# Patient Record
Sex: MALE | Race: WHITE | Employment: FULL TIME | URBAN - METROPOLITAN AREA
[De-identification: names, ages, dates, MRNs, and addresses within clinical notes are randomized per-mention and may not be internally consistent; named-entity substitution may affect disease eponyms.]

---

## 2019-10-07 ENCOUNTER — TRANSCRIBE ORDERS (OUTPATIENT)
Dept: URGENT CARE | Facility: CLINIC | Age: 29
End: 2019-10-07

## 2019-10-07 DIAGNOSIS — Z00.8 ENCOUNTER FOR BIOMETRIC SCREENING: Primary | ICD-10-CM

## 2019-10-07 DIAGNOSIS — Z23 NEEDS FLU SHOT: ICD-10-CM

## 2019-10-07 DIAGNOSIS — Z00.8 ENCOUNTER FOR BIOMETRIC SCREENING: ICD-10-CM

## 2019-10-07 PROCEDURE — 80323 ALKALOIDS NOS: CPT | Performed by: NURSE PRACTITIONER

## 2019-10-15 ENCOUNTER — TELEPHONE (OUTPATIENT)
Dept: URGENT CARE | Facility: CLINIC | Age: 29
End: 2019-10-15

## 2019-10-15 LAB
COTININE SERPL-MCNC: NORMAL NG/ML
NICOTINE SERPL-MCNC: NORMAL NG/ML

## 2019-10-29 ENCOUNTER — APPOINTMENT (OUTPATIENT)
Dept: URGENT CARE | Facility: CLINIC | Age: 29
End: 2019-10-29

## 2019-10-29 VITALS
DIASTOLIC BLOOD PRESSURE: 78 MMHG | BODY MASS INDEX: 23.49 KG/M2 | SYSTOLIC BLOOD PRESSURE: 138 MMHG | WEIGHT: 177.2 LBS | HEIGHT: 73 IN

## 2019-10-29 DIAGNOSIS — Z13.6 SCREENING FOR CARDIOVASCULAR CONDITION: Primary | ICD-10-CM

## 2019-11-27 ENCOUNTER — OFFICE VISIT (OUTPATIENT)
Dept: URGENT CARE | Facility: CLINIC | Age: 29
End: 2019-11-27

## 2019-11-27 VITALS
OXYGEN SATURATION: 97 % | HEART RATE: 63 BPM | RESPIRATION RATE: 16 BRPM | SYSTOLIC BLOOD PRESSURE: 110 MMHG | TEMPERATURE: 97.6 F | DIASTOLIC BLOOD PRESSURE: 78 MMHG

## 2019-11-27 DIAGNOSIS — S81.812A LACERATION OF LEFT LEG, INITIAL ENCOUNTER: Primary | ICD-10-CM

## 2019-11-27 NOTE — PROGRESS NOTES
[facility]        NAME: Radha Hobbs is a 30 y o  male  : 1990    MRN: [id number]  DATE: 2019  TIME: 11:46 AM    Assessment and Plan   Laceration of left leg, initial encounter [S81 162A]  1  Laceration of left leg, initial encounter           Patient Instructions       Go to urgent care today for wound closure  Update your tetanus vaccine today  Keep your wound clean and dry  Watch for signs of infection such as redness, swelling, drainage, or severe pain  Seek medical care if this occurs  Proceed to  ER if symptoms worsen  Chief Complaint   No chief complaint on file  History of Present Illness       Last night pt was doing Crossfit when he hit his left lower leg on a wooden box  Laceration to his left anterior lower leg  He cleaned the wound with rubbing alcohol and applied a dressing  Mild bleeding and oozing continues today  No severe pain or inability to walk  He cannot recall his last tetanus vaccine  Review of Systems   Review of Systems   Constitutional: Negative  Respiratory: Negative  Cardiovascular: Negative  Skin: Positive for wound (laceration to anterior left lower leg)  Neurological: Negative  All other systems reviewed and are negative  Current Medications     No current outpatient medications on file  Current Allergies     Allergies as of 2019    (No Known Allergies)            The following portions of the patient's history were reviewed and updated as appropriate: allergies, current medications, past family history, past medical history, past social history, past surgical history and problem list      Past Medical History:   Diagnosis Date    Eczema        Past Surgical History:   Procedure Laterality Date    WISDOM TOOTH EXTRACTION         History reviewed  No pertinent family history  Medications have been verified          Objective   /78 (BP Location: Right arm, Patient Position: Sitting, Cuff Size: Adult)   Pulse 63   Temp 97 6 °F (36 4 °C) (Tympanic)   Resp 16   SpO2 97%        Physical Exam     Physical Exam   Constitutional: He is oriented to person, place, and time  He appears well-developed and well-nourished  HENT:   Head: Normocephalic and atraumatic  Cardiovascular: Normal rate  Pulmonary/Chest: Effort normal    Musculoskeletal: Normal range of motion  Neurological: He is alert and oriented to person, place, and time  Skin: Skin is warm and dry  Capillary refill takes less than 2 seconds  Laceration noted  Psychiatric: He has a normal mood and affect  His behavior is normal    Vitals reviewed

## 2019-11-27 NOTE — ASSESSMENT & PLAN NOTE
Wound cleaned and steri strips applied  Unable to approximate wound edges with steri strips alone  Pt referred to urgent care for more complex wound closure and tetanus vaccine

## 2019-11-27 NOTE — PATIENT INSTRUCTIONS
Go to urgent care today for wound closure  Update your tetanus vaccine today  Keep your wound clean and dry  Watch for signs of infection such as redness, swelling, drainage, or severe pain  Seek medical care if this occurs  Laceration, Ambulatory Care   GENERAL INFORMATION:   A laceration  is an injury to the skin and the soft tissue underneath it  Lacerations happen when you are cut or hit by something  They can happen anywhere on the body  Common symptoms include the following:   · Injury or wound to skin and tissue of any shape size that looks like a cut, tear, or gash    · Edges of the wound may be close together or wide apart    · The injury may hurt, bleed, bruise, or swell    · Lacerations in certain areas of the body, such as the scalp, may bleed a lot    · Numbness around the wound    · Decreased movement in an area below the wound  Seek immediate care for the following symptoms:   · Symptoms such as redness, pain, or fever that get worse quickly    · Heavy bleeding or bleeding that does not stop after 10 minutes of holding firm, direct pressure over the wound  Treatment for a laceration  includes care to stop any bleeding  Your healthcare provider will stop the bleeding by applying pressure to the wound  He may need to check your wound for foreign objects and clean it to decrease the chance of infection  You may be given medicine to numb the area and decrease pain  Your laceration may be closed with stitches, staples, tissue glue, or medical strips  Some lacerations may heal better without stitches  Ask your healthcare provider if you need a tetanus shot  Care for a laceration:   · Keep the wound dry for the first 24 to 48 hours  or as directed  Wash your hands with soap and warm water before and after you care for your wound  After that, gently clean the wound once or twice a day with cool water  Use soap to clean around the wound, but try not to get any on the wound edges   Do not use alcohol or hydrogen peroxide to clean your wound unless you are directed to do so  · Leave your bandage on as long as directed  Bandages keep your wound clean and protected  They can also prevent swelling  Ask when and how to change your bandage  Be careful not to wrap the bandage or tape too tightly  This could cut off blood flow and cause more injury  · Gently clean with soap and water if your wound was closed with staples or stitches  Remove the bandage over the area and gently clean with soap and water 24 to 48 hours after your injury  Pat the area dry and cover again with a clean dressing  · Keep the area clean and dry if your wound was closed with wound tape  You may have wound tape or medical strips to hold your wound closed  The strips will usually fall off on their own after several days  · Do not use any ointments or lotions on the area if your wound was closed with tissue glue  You may shower, but do not swim or soak in a bathtub  Gently pat the area dry after you take a shower  Do not pick at or scrub the glue area  If the glue comes off too soon, call your primary healthcare provider  Never use your own glue to put the wound back together  Follow up with your healthcare provider as directed:  Write down your questions so you remember to ask them during your visits  CARE AGREEMENT:   You have the right to help plan your care  Learn about your health condition and how it may be treated  Discuss treatment options with your caregivers to decide what care you want to receive  You always have the right to refuse treatment  The above information is an  only  It is not intended as medical advice for individual conditions or treatments  Talk to your doctor, nurse or pharmacist before following any medical regimen to see if it is safe and effective for you    © 2014 [employer] is for End User's use only and may not be sold, redistributed or otherwise used for commercial purposes  All illustrations and images included in CareNotes® are the copyrighted property of A D [employer]  or [employer]ahan

## 2020-10-01 ENCOUNTER — APPOINTMENT (OUTPATIENT)
Dept: URGENT CARE | Facility: CLINIC | Age: 30
End: 2020-10-01

## 2020-10-01 DIAGNOSIS — Z23 NEEDS FLU SHOT: Primary | ICD-10-CM

## 2021-09-16 ENCOUNTER — APPOINTMENT (OUTPATIENT)
Dept: URGENT CARE | Facility: CLINIC | Age: 31
End: 2021-09-16

## 2021-09-16 DIAGNOSIS — Z23 NEEDS FLU SHOT: Primary | ICD-10-CM

## 2022-09-20 ENCOUNTER — APPOINTMENT (OUTPATIENT)
Dept: URGENT CARE | Facility: CLINIC | Age: 32
End: 2022-09-20

## 2022-09-20 DIAGNOSIS — Z23 NEEDS FLU SHOT: Primary | ICD-10-CM

## 2022-09-22 ENCOUNTER — OFFICE VISIT (OUTPATIENT)
Dept: URGENT CARE | Facility: CLINIC | Age: 32
End: 2022-09-22

## 2022-09-22 VITALS
RESPIRATION RATE: 16 BRPM | OXYGEN SATURATION: 97 % | DIASTOLIC BLOOD PRESSURE: 68 MMHG | HEART RATE: 62 BPM | TEMPERATURE: 97.6 F | SYSTOLIC BLOOD PRESSURE: 110 MMHG

## 2022-09-22 DIAGNOSIS — Z29.8 ALTITUDE SICKNESS PROPHYLAXIS: Primary | ICD-10-CM

## 2022-09-22 PROBLEM — Z29.89 ALTITUDE SICKNESS PROPHYLAXIS: Status: ACTIVE | Noted: 2022-09-22

## 2022-09-22 RX ORDER — LORATADINE 10 MG/1
10 TABLET ORAL DAILY
COMMUNITY

## 2022-09-22 RX ORDER — ACETAZOLAMIDE 125 MG/1
125 TABLET ORAL 2 TIMES DAILY
Qty: 18 TABLET | Refills: 0 | Status: SHIPPED | OUTPATIENT
Start: 2022-09-22 | End: 2022-10-01

## 2022-09-22 RX ORDER — DUTASTERIDE 0.5 MG/1
0.5 CAPSULE, LIQUID FILLED ORAL DAILY
COMMUNITY

## 2022-09-22 NOTE — PATIENT INSTRUCTIONS
Take Diamox as prescribed, take with food  Stay hydrated  Recommend slow ascent and descent  Seek emergency medical care if needed

## 2022-09-22 NOTE — ASSESSMENT & PLAN NOTE
Discussed preventative measures and Rx written for Diamox  Pt advised on appropriate use  Reasons to seek follow up care reviewed with pt  All questions answered

## 2022-09-22 NOTE — PROGRESS NOTES
[facility]        NAME: Gloria Torres is a 27 y o  male  : 1990    MRN: [id number]  DATE: 2022  TIME: 3:00 PM    Assessment and Plan   Altitude sickness prophylaxis [Z29 8]  1  Altitude sickness prophylaxis  acetaZOLAMIDE (DIAMOX) 125 mg tablet         Patient Instructions       Take Diamox as prescribed, take with food  Stay hydrated  Recommend slow ascent and descent  Seek emergency medical care if needed  Proceed to  ER if symptoms worsen  Chief Complaint     Chief Complaint   Patient presents with    Travel Consult     Needs altitude sickness prophylaxis          History of Present Illness       Pt will be traveling to [place] (regions include [place], [facility], hiking in [place], and along the [facility] [place] border) on 10/23/22 for 10 days  He will be hiking and camping  He will reach the apex of his hike on day 5  He is up to date on all his vaccines  He requests altitude sickness prevention medication  He has never traveled at this high of an altitude before  No known liver or kidney disease  He feels well today, no medical problems reported  Review of Systems   Review of Systems   Constitutional: Negative  Negative for chills and fever  HENT: Negative  Respiratory: Negative  Negative for cough, shortness of breath and wheezing  Cardiovascular: Negative  Negative for chest pain and palpitations  Gastrointestinal: Negative  Negative for abdominal pain, diarrhea, nausea and vomiting  Endocrine: Negative  Genitourinary: Negative  Musculoskeletal: Negative  Skin: Negative  Negative for rash  Allergic/Immunologic: Positive for environmental allergies (seasonal allergies- controlled with OTC antihistamines)  Neurological: Negative  Hematological: Negative  Psychiatric/Behavioral: Negative  All other systems reviewed and are negative          Current Medications       Current Outpatient Medications:     acetaZOLAMIDE (DIAMOX) 125 mg tablet, Take 1 tablet (125 mg total) by mouth 2 (two) times a day for 9 days Start 24-48 hours prior to ascent, stop 2-3 days after peak arrival , Disp: 18 tablet, Rfl: 0    dutasteride (AVODART) 0 5 mg capsule, Take 0 5 mg by mouth daily, Disp: , Rfl:     loratadine (CLARITIN) 10 mg tablet, Take 10 mg by mouth daily, Disp: , Rfl:     Current Allergies     Allergies as of 09/22/2022    (No Known Allergies)            The following portions of the patient's history were reviewed and updated as appropriate: allergies, current medications, past family history, past medical history, past social history, past surgical history and problem list      Past Medical History:   Diagnosis Date    Allergic rhinitis     seasonal- worse in fall    Eczema        Past Surgical History:   Procedure Laterality Date    WISDOM TOOTH EXTRACTION         History reviewed  No pertinent family history  Medications have been verified  Objective   /68 (BP Location: Right arm, Patient Position: Sitting, Cuff Size: Adult)   Pulse 62   Temp 97 6 °F (36 4 °C) (Tympanic)   Resp 16   SpO2 97%   No LMP for male patient  Physical Exam     Physical Exam  Vitals reviewed  Constitutional:       Appearance: Normal appearance  He is well-developed  HENT:      Head: Normocephalic and atraumatic  Right Ear: External ear normal       Left Ear: External ear normal       Nose: Nose normal       Mouth/Throat:      Mouth: Mucous membranes are moist       Pharynx: Oropharynx is clear  Eyes:      Conjunctiva/sclera: Conjunctivae normal       Pupils: Pupils are equal, round, and reactive to light  Cardiovascular:      Rate and Rhythm: Normal rate and regular rhythm  Pulses: Normal pulses  Pulmonary:      Effort: Pulmonary effort is normal       Breath sounds: Normal breath sounds  Musculoskeletal:         General: Normal range of motion  Skin:     General: Skin is warm and dry        Capillary Refill: Capillary refill takes less than 2 seconds  Neurological:      General: No focal deficit present  Mental Status: He is alert and oriented to person, place, and time     Psychiatric:         Mood and Affect: Mood normal          Behavior: Behavior normal

## 2024-02-21 PROBLEM — S81.812A LACERATION OF LEFT LEG, INITIAL ENCOUNTER: Status: RESOLVED | Noted: 2019-11-27 | Resolved: 2024-02-21

## 2025-03-02 NOTE — PROGRESS NOTES
"Name: Tayo Aviles      : 1990      MRN: [id number]  Encounter Provider: Stephanie Mclean MD  Encounter Date: 3/3/2025   Encounter department: [facility]  :  Assessment & Plan  Patellofemoral syndrome of left knee  -Patient with physical examination, history consistent with patellofemoral syndrome of the left knee  -Patient provided with home exercise regiment to help with mitigation of patellofemoral syndrome as well as exercise and therapy to help with strengthening of the knee and ankle joint.  -Patient also counseled on utilization of compression sleeve of the affected knee to help with pain medication during exercise activities and daily activities.  -Risks and benefits with regards to potential PRP injection of the affected joint was discussed with the patient and the patient was interested in pursuing with PRP injection in the future.  Patient was provided with appropriate documentation for review, instructions for scheduling an appropriate time slot to pursue PRP injection, and informed of risks and benefits.    Patient to return after appropriate scheduling for PRP injection be conducted in the clinic.         Acute pain of left knee  See plan as discussed under \"patellofemoral syndrome of left knee\" documented on 3/30/2025  Orders:    XR knee 3 vw left non injury; Future        History of Present Illness   HPI  Tayo Aviles is a 34 y.o. male who presents new evaluation of left knee pain.    Patient describes insidious onset of left knee pain that is anterior and underneath his kneecap.  He describes that it was exacerbated with deep squatting.  Describes that he is routinely active with participation in running, CrossFit, and other strenuous activities.  Describes mileage that he be running per week as approximately 50 miles but has had a decrease in this mild duration given the cold temperatures over the winter.    States that he has been proceeding with activity " "modification with no utilization of over-the-counter medications or other ointments or creams to the area and has noted that the pain intensity has diminished but not completed abated and the frequency is less noticeable.  Does describe when he has the knee in flexion while putting on socks will cause an exacerbation or grinding sensation in his knee.    No overlying redness or erythema.  No acute traumatic process to the knee that the patient is aware of.  History obtained from: patient    Review of Systems  Pertinent Medical History   No previous injuries to this joint or extremity.         Medical History Reviewed by provider this encounter:     .  Current Outpatient Medications on File Prior to Visit   Medication Sig Dispense Refill    dutasteride (AVODART) 0.5 mg capsule Take 0.5 mg by mouth daily      loratadine (CLARITIN) 10 mg tablet Take 10 mg by mouth daily      acetaZOLAMIDE (DIAMOX) 125 mg tablet Take 1 tablet (125 mg total) by mouth 2 (two) times a day for 9 days Start 24-48 hours prior to ascent, stop 2-3 days after peak arrival. 18 tablet 0     No current facility-administered medications on file prior to visit.      Social History     Tobacco Use    Smoking status: Never    Smokeless tobacco: Never   Vaping Use    Vaping status: Never Used   Substance and Sexual Activity    Alcohol use: Yes    Drug use: Never    Sexual activity: Not on file        Objective   Ht 6' 1\" (1.854 m)   Wt 83.9 kg (185 lb)   BMI 24.41 kg/m²      Physical Exam    Administrative Statements   I have spent a total time of 25 minutes in caring for this patient on the day of the visit/encounter including Diagnostic results, Prognosis, Risks and benefits of tx options, Instructions for management, Patient and family education, Risk factor reductions, Impressions, Counseling / Coordination of care, Documenting in the medical record, Reviewing/placing orders in the medical record (including tests, medications, and/or procedures), " "and Obtaining or reviewing history  .     Left Ankle Exam     Comments:  PHYSICAL EXAMINATION    Musculoskeletal: left Knee Examination:  General: The patient is alert, oriented, and pleasant to interact with.  Patient ambulates with Normal gait pattern  Assistive Device: No  Alignment: normal  Skin is warm and dry to touch with no signs of erythema, ecchymosis, or infection   Effusion: none  ROM: 0° - 135°  verus 0° - 135° on contralateral side  MMT: 5/5 throughout  TTP: None  Flexor and extensor mechanisms are intact   Knee is stable to varus and valgus stress  Shakeel's Test Negative    Lachman's Test - 1A  Anterior Drawer Test - 1A  Posterior Drawer Test - 1A  Pivot Shift - 0  Lateral Patellar Glide - 1/4  Medial Patellar Glide - 1/4  Patella tracks centrally without palpable crepitus  Positive patellar grind test  Calf compartments are soft and supple  negative Ling's sign  2+ DP and PT pulses with brisk capillary refill to the toes  Sural, saphenous, tibial, superficial, and deep peroneal motor and sensory distributions intact  Sensation light touch intact distally             I have personally reviewed pertinent films in PACS and my interpretation is mild patella minnie with no appreciable degenerative changes, joint space narrowing, or osteophytes or subchondral sclerosis on my independent/wet read of x-ray knee 3 views conducted on 3/3/2025..   Procedures  Portions of the record may have been created with voice recognition software. Occasional wrong word or \"sound alike\" substitutions may have occurred due to the inherent limitations of voice recognition software. Please review the chart carefully and recognize, using context, where substitutions/typographical errors may have occurred.      "

## 2025-03-03 ENCOUNTER — APPOINTMENT (OUTPATIENT)
Dept: RADIOLOGY | Facility: OTHER | Age: 35
End: 2025-03-03
Payer: COMMERCIAL

## 2025-03-03 ENCOUNTER — OFFICE VISIT (OUTPATIENT)
Dept: OBGYN CLINIC | Facility: OTHER | Age: 35
End: 2025-03-03
Payer: COMMERCIAL

## 2025-03-03 VITALS — WEIGHT: 185 LBS | HEIGHT: 73 IN | BODY MASS INDEX: 24.52 KG/M2

## 2025-03-03 DIAGNOSIS — M25.562 ACUTE PAIN OF LEFT KNEE: ICD-10-CM

## 2025-03-03 DIAGNOSIS — M22.2X2 PATELLOFEMORAL SYNDROME OF LEFT KNEE: Primary | ICD-10-CM

## 2025-03-03 PROCEDURE — 73562 X-RAY EXAM OF KNEE 3: CPT

## 2025-03-03 PROCEDURE — 99203 OFFICE O/P NEW LOW 30 MIN: CPT | Performed by: ORTHOPAEDIC SURGERY

## 2025-03-03 NOTE — PATIENT INSTRUCTIONS
PRP (Platelet Rich Plasma) Injection      What is PRP?  Platelet?rich plasma is a fraction of your blood which contains a high concentration of platelets than whole blood. PRP therapy involves the injection of this platelet rich concentrate, rich in growth factors and nutrients, to enhance the natural healing response of an injured tissue.    What are the different orthopedic conditions for which PRP may be used?  PRP therapy may be used for conditions like osteoarthritis (degenerative joint disease), chronic tendinitis or chronic ligament and muscle injuries. Some examples include knee osteoarthritis, lateral (tennis elbow) or medial epicondylitis (golfer's elbow) of elbow, patellar or Achilles tendinitis, plantar fasciitis, chronic ligament sprains, etc.    Is PRP therapy right for you?  If you have tried conventional treatment methods (e.g. physical therapy, oral pain medications, bracing, etc.) and persist with chronic joint, muscle or tendon pain; then PRP therapy may be an option. Since every individual and condition is unique, it's best to be clinically evaluated by your physician to determine if PRP is a good option for you.    What are some contraindications for PRP therapy?  It's best to discuss your medical conditions and medications with your physician prior to receiving PRP therapy. Some medical conditions may not be appropriate for PRP therapy e.g. blood disorders, anemia or low platelet count, immunological problems, cancer, active or prolonged infections, pregnancy, chronic smoking.  Certain medications may lower the efficacy of PRP therapy. Examples include aspirin, NSAID's (Motrin, Advil, Aleve, Mobic/ ibuprofen, naproxen, diclofenac, meloxicam, etc.), blood thinners.     What is the scientific evidence for PRP therapy?  The scientific evidence for PRP is evolving. The position statement from American [employer] Society for Sports Medicine, published in 2021 indicates that PRP is effective in reducing  pain and improving function in patients with knee osteoarthritis, particularly in those with mild to moderate disease or younger individuals. Evidence for osteoarthritis of other joints is evolving. There is also some evidence for pain reduction in conditions such as lateral epicondylitis and other tendonitis. The evidence regarding PRP use for muscle or ligament injuries is limited.     What are the risks associated with PRP injection?  PRP injections are relatively low risk since it's your own blood. Common risks include transient injection site increased pain, infection risk. You should discuss the potential risks for your PRP injection with your physician since it could vary based on your specific condition and injection site.    What can I do to optimize my PRP therapy?  Pre-procedure:   Discuss your physician if you need to withhold any medications e.g. blood thinners, pain medications before the procedure.  Stay well hydrated on the day of procedure.  Inform physician of any recent medication change or infection.  If able to, perform some light exercise prior to the procedure. Some studies show improved platelet concentration following physical activity.  Post-procedure:  Avoid/ minimize taking NSAID's and blood thinners as per the direction of your treating physician.  Keep the injection area clean and dry and avoid submerging under water e.g. bathtub, swimming pool, ocean water for 4-5 days. May take a shower next day.  You may experience some increased discomfort in the area where PRP was injected. This may last from a few days to 2-3 weeks. You can apply local ice for comfort and discuss medication options with your physician for any increased pain.  It's advisable to have someone accompany you for the office visit as you may need help in driving following the injection.   Avoid strenuous exercise of the injected area until symptoms improve and following the guidance of your treating physician/ physical  therapist.  Contact treating physician immediately if there is significant increase in injection area pain associated with spreading redness, fever or chills.     How is the procedure performed?  A blood sample is taken from the patient like having a lab test for blood. This sample is then placed in a centrifuge machine which separates the blood in different components. One component, platelet rich plasm (PRP) is carefully extracted from the centrifuged sample. This is then injected in the tissue/joint of concern. Sterile precautions are maintained throughout the procedure. Your physician may use an ultrasound machine to accurately deliver the injection into the tissue/joint of concern.     Do I need to do physical therapy after the PRP injection?  Several studies have demonstrated synergistic effect of physical therapy with PRP injection. Physical therapy after PRP is typically customized based on patient's individual scenario. Discuss with your physician about need and duration of physical therapy following your PRP injection.    When can I expect improvement of symptoms, and will I need further PRP injections?  Symptom improvement can be variable among individuals. Commonly, most people will start noticing improvement in 6-8 weeks following the injection. If there is partial improvement of symptoms, you may opt for another PRP injection typically after 6-8 weeks.    Insurance coverage/Cost  Most traditional insurance companies currently do not cover the cost of PRP injection as it is not “FDA approved” and considered “FDA cleared”. Since PRP is derived from patient's own blood, it's not considered a drug by FDA. “FDA clearance” means that a device has been noted to be “substantially equivalent” to a currently marketed device. The cost of injection may vary depending on type and number of PRP injections. Please confirm from your physician's office, the exact cost and mode of payment for your PRP injection prior to  scheduling the procedure.

## 2025-03-03 NOTE — ASSESSMENT & PLAN NOTE
-Patient with physical examination, history consistent with patellofemoral syndrome of the left knee  -Patient provided with home exercise regiment to help with mitigation of patellofemoral syndrome as well as exercise and therapy to help with strengthening of the knee and ankle joint.  -Patient also counseled on utilization of compression sleeve of the affected knee to help with pain medication during exercise activities and daily activities.  -Risks and benefits with regards to potential PRP injection of the affected joint was discussed with the patient and the patient was interested in pursuing with PRP injection in the future.  Patient was provided with appropriate documentation for review, instructions for scheduling an appropriate time slot to pursue PRP injection, and informed of risks and benefits.    Patient to return after appropriate scheduling for PRP injection be conducted in the clinic.

## 2025-03-03 NOTE — ASSESSMENT & PLAN NOTE
"See plan as discussed under \"patellofemoral syndrome of left knee\" documented on 3/30/2025  Orders:    XR knee 3 vw left non injury; Future    "

## 2025-03-21 ENCOUNTER — PROCEDURE VISIT (OUTPATIENT)
Dept: OBGYN CLINIC | Facility: OTHER | Age: 35
End: 2025-03-21
Payer: COMMERCIAL

## 2025-03-21 VITALS — BODY MASS INDEX: 24.52 KG/M2 | WEIGHT: 185 LBS | HEIGHT: 73 IN

## 2025-03-21 DIAGNOSIS — M22.2X2 PATELLOFEMORAL SYNDROME OF LEFT KNEE: ICD-10-CM

## 2025-03-21 DIAGNOSIS — M25.562 ACUTE PAIN OF LEFT KNEE: Primary | ICD-10-CM

## 2025-03-21 PROCEDURE — 20611 DRAIN/INJ JOINT/BURSA W/US: CPT

## 2025-03-21 NOTE — PATIENT INSTRUCTIONS
The patient was explained that PRP injections stands for platelet rich plasma in which he will undergo venipuncture.  The obtained blood sample is then centrifuge to isolate a concentrate of platelet rich plasma which is thereafter injected into the pathological tissue.  The patient was explained that this method of treatment is still considered experimental and is not reversible via his insurance.  It was confirmed that the patient currently does not have any blood dyscrasias or active symptoms of infection either locally or systemically.  The patient was explained that the treatment has likelihood of higher efficacy if combined with physical therapy rehabilitation.  Post injection, patient is advised not to use any nonsteroidal anti-inflammatory medication for pain control at least over the next 2-3 weeks.  However, the patient may consider using local ice application or oral acetaminophen for pain as needed.  Upon explanation of risks and benefits of this procedure a verbal consent was obtained.  Under sterile conditions, 15 mL of venous blood was obtained via venipuncture of the right and left upper extremity.  Upon centrifugation of the sample 5 mL of platelet rich plasma was obtained.  The clinically and radiologically reported area of pathology in the left knee was identified via office based clinical ultrasound.  Injection site was cleaned using iodine, chlorhexidine and alcohol.  A sterile ultrasound transducer cover and sterile ultrasound gel were used along with a linear transducer.  A 3.5 inch spinal needle was then introduced through an inplane approach under direct needle visualization into the area of pathology. 5 mL of PRP solution was then injected into this area of pathology under direct ultrasound visualization.  Thereafter, a sterile dressing was placed.  The patient tolerated the procedure well without any immediate complications.

## 2025-03-21 NOTE — PROGRESS NOTES
Large joint arthrocentesis: L knee  Universal Protocol:  Procedure performed by: (Dr. Clark Swanson performed the procedure under my direct supervision.)  Consent: Verbal consent obtained.  Risks and benefits: risks, benefits and alternatives were discussed  Consent given by: patient  Patient understanding: patient states understanding of the procedure being performed  Site marked: the operative site was marked  Radiology Images displayed and confirmed. If images not available, report reviewed: imaging studies available  Required items: required blood products, implants, devices, and special equipment available  Patient identity confirmed: verbally with patient  Supporting Documentation  Indications: pain   Procedure Details  Location: knee - L knee  Preparation: Patient was prepped and draped in the usual sterile fashion  Needle size: 22 G (3.5 in spinal needle)  Ultrasound guidance: yes (Linear transducer with inplane approach with aseptic technique)  Approach: Superolateral.    Patient tolerance: patient tolerated the procedure well with no immediate complications  Dressing:  Sterile dressing applied    The patient was explained that PRP injections stands for platelet rich plasma in which he will undergo venipuncture.  The obtained blood sample is then centrifuge to isolate a concentrate of platelet rich plasma which is thereafter injected into the pathological tissue.  The patient was explained that this method of treatment is still considered experimental and is not reversible via his insurance.  It was confirmed that the patient currently does not have any blood dyscrasias or active symptoms of infection either locally or systemically.  The patient was explained that the treatment has likelihood of higher efficacy if combined with physical therapy rehabilitation.  Post injection, patient is advised not to use any nonsteroidal anti-inflammatory medication for pain control at least over the next 2-3 weeks.   However, the patient may consider using local ice application or oral acetaminophen for pain as needed.  Upon explanation of risks and benefits of this procedure a verbal consent was obtained.    Under sterile conditions, 15 mL of venous blood was obtained via venipuncture of the  left upper extremity.  Upon centrifugation of the sample adequate volume of PRP was not obtained.  Patient subsequently underwent another venipuncture of the right upper extremity to obtain 15 mL of venous blood via venipuncture.  Upon centrifugation of the second sample an adequate amount of 5 mL PRP was obtained..  The clinically and radiologically reported area of pathology in the left knee was identified via office based clinical ultrasound.  Injection site was cleaned using iodine, chlorhexidine and alcohol.  Sterile ultrasound gel was used along with a linear transducer using a sterile technique.  A 3.5 inch spinal needle was then introduced through an inplane approach under direct needle visualization into the area of pathology. 5 mL of PRP solution was then injected into this area of pathology under direct ultrasound visualization.  Thereafter, a sterile dressing was placed.  The patient tolerated the procedure well without any immediate complications.